# Patient Record
Sex: MALE | Race: WHITE | NOT HISPANIC OR LATINO | Employment: UNEMPLOYED | URBAN - METROPOLITAN AREA
[De-identification: names, ages, dates, MRNs, and addresses within clinical notes are randomized per-mention and may not be internally consistent; named-entity substitution may affect disease eponyms.]

---

## 2017-01-16 ENCOUNTER — OFFICE VISIT (OUTPATIENT)
Dept: INTERNAL MEDICINE | Facility: CLINIC | Age: 42
End: 2017-01-16
Payer: MEDICARE

## 2017-01-16 VITALS
DIASTOLIC BLOOD PRESSURE: 76 MMHG | HEART RATE: 82 BPM | WEIGHT: 182.75 LBS | BODY MASS INDEX: 27.7 KG/M2 | SYSTOLIC BLOOD PRESSURE: 126 MMHG | HEIGHT: 68 IN | RESPIRATION RATE: 17 BRPM

## 2017-01-16 DIAGNOSIS — Z86.19 HISTORY OF HEPATITIS C: ICD-10-CM

## 2017-01-16 DIAGNOSIS — H61.22 LEFT EAR IMPACTED CERUMEN: ICD-10-CM

## 2017-01-16 DIAGNOSIS — Z00.00 HEALTH CARE MAINTENANCE: Primary | ICD-10-CM

## 2017-01-16 DIAGNOSIS — H93.13 TINNITUS, BILATERAL: ICD-10-CM

## 2017-01-16 DIAGNOSIS — F19.11 HISTORY OF SUBSTANCE ABUSE: ICD-10-CM

## 2017-01-16 DIAGNOSIS — E66.3 OVERWEIGHT (BMI 25.0-29.9): ICD-10-CM

## 2017-01-16 DIAGNOSIS — G89.29 CHRONIC BILATERAL LOW BACK PAIN WITHOUT SCIATICA: ICD-10-CM

## 2017-01-16 DIAGNOSIS — M54.50 CHRONIC BILATERAL LOW BACK PAIN WITHOUT SCIATICA: ICD-10-CM

## 2017-01-16 PROCEDURE — 99999 PR PBB SHADOW E&M-EST. PATIENT-LVL IV: CPT | Mod: PBBFAC,,, | Performed by: INTERNAL MEDICINE

## 2017-01-16 PROCEDURE — 99396 PREV VISIT EST AGE 40-64: CPT | Mod: S$GLB,,, | Performed by: INTERNAL MEDICINE

## 2017-01-16 NOTE — MR AVS SNAPSHOT
[facility] - Internal Medicine  [address]  [facility] [address]  Phone: [phone]  Fax: [phone]                  Max Gaona   2017 3:20 PM   Office Visit    Description:  Male : 1975   Provider:  Christine Willoughby MD   Department:  [facility] - Internal Medicine           Reason for Visit     Follow-up           Diagnoses this Visit        Comments    Health care maintenance    -  Primary     History of hepatitis C         History of substance abuse         Tinnitus, bilateral         Left ear impacted cerumen         Overweight (BMI 25.0-29.9)         Chronic bilateral low back pain without sciatica                To Do List           Goals (5 Years of Data)     None      Follow-Up and Disposition     Return in about 1 year (around 2018), or if symptoms worsen or fail to improve, for 40 minute established physical.    Follow-up and Disposition History      [facility] -  Assistance  Registered nurses in the [facility] provide clinical advisement, health education, appointment booking, and other advisory services.  Call for this free service at [phone].             Medications           Message regarding Medications     Verify the changes and/or additions to your medication regime listed below are the same as discussed with your clinician today.  If any of these changes or additions are incorrect, please notify your healthcare provider.        STOP taking these medications     ledipasvir-sofosbuvir  mg Tab Take 1 tablet by mouth once daily.    ondansetron (ZOFRAN) 4 MG tablet Take 1 tablet (4 mg total) by mouth every 8 (eight) hours as needed for Nausea.           Verify that the below list of medications is an accurate representation of the medications you are currently taking.  If none reported, the list may be blank. If incorrect, please contact your healthcare provider. Carry this list with  "you in case of emergency.           Current Medications     SUBOXONE 8-2 mg Film            Clinical Reference Information           Vital Signs - Last Recorded  Most recent update: 1/16/2017  3:51 PM by Yamilet Cain MA    BP Pulse Resp Ht Wt BMI    126/76 82 17 5' 8" (1.727 m) 82.9 kg (182 lb 12.2 oz) 27.79 kg/m2      Blood Pressure          Most Recent Value    BP  126/76      Allergies as of 1/16/2017     No Known Allergies      Immunizations Administered on Date of Encounter - 1/16/2017     None      Orders Placed During Today's Visit      Normal Orders This Visit    Ambulatory consult to ENT     Ambulatory consult to [facility]     Future Labs/Procedures Expected by Expires    CBC auto differential  1/16/2017 3/17/2018    Comprehensive metabolic panel  1/16/2017 3/17/2018    Hemoglobin A1c  1/16/2017 3/17/2018    Hepatitis C RNA, quantitative, PCR  1/16/2017 3/17/2018    Lipid panel  1/16/2017 3/17/2018    TSH  1/16/2017 3/17/2018      [facility] Sign-Up     Activating your [facility] account is as easy as 1-2-3!     1) Visit [email or web address], select Sign Up Now, enter this activation code and your date of birth, then select Next.  [email or web address]  Expires: 3/2/2017  4:31 PM      2) Create a username and password to use when you visit [facility] in the future and select a security question in case you lose your password and select Next.    3) Enter your e-mail address and click Sign Up!    Additional Information  If you have questions, please e-mail [email or web address] or call [phone] to talk to our [facility] staff. Remember, [facility] is NOT to be used for urgent needs. For medical emergencies, dial 911.         "

## 2017-01-16 NOTE — PROGRESS NOTES
"Subjective:       Patient ID: Max Gaona is a 41 y.o. male.    Chief Complaint: Follow-up    HPI   40 yo M with hx of hep c now cured and hx drug abuse on suboxone presents for tinnitus and cancer screenings.  Last seen 2015.     He reports he is having ringing in his ears. Requests ear cleaning.     Leg pains - intermittent. After sitting or standing for long periods. Back gets stiff.   Not bothering him today.   Review of Systems   Constitutional: Negative for fever.   HENT: Positive for tinnitus.    Eyes: Negative.    Respiratory: Negative for shortness of breath.    Cardiovascular: Negative for chest pain and leg swelling.   Gastrointestinal: Negative for abdominal pain, diarrhea, nausea and vomiting.   Genitourinary: Negative.    Musculoskeletal: Positive for back pain. Negative for arthralgias.   Skin: Negative.  Negative for rash.   Psychiatric/Behavioral: Negative.        Objective:     Visit Vitals    /76    Pulse 82    Resp 17    Ht 5' 8" (1.727 m)    Wt 82.9 kg (182 lb 12.2 oz)    BMI 27.79 kg/m2        Physical Exam   Constitutional: He is oriented to person, place, and time. He appears well-developed and well-nourished.   HENT:   Head: Normocephalic and atraumatic.   Eyes: Conjunctivae and EOM are normal. Pupils are equal, round, and reactive to light.   Neck: Neck supple. No thyromegaly present.   Cardiovascular: Normal rate, regular rhythm and normal heart sounds.    No murmur heard.  Pulmonary/Chest: Effort normal and breath sounds normal. No respiratory distress. He has no wheezes.   Abdominal: Soft. Bowel sounds are normal. He exhibits no distension. There is no tenderness.   Musculoskeletal: Normal range of motion.   Neurological: He is alert and oriented to person, place, and time.   Skin: Skin is warm and dry. No rash noted.   Psychiatric: He has a normal mood and affect. Judgment and thought content normal.   Vitals reviewed.      Assessment:       1. Health care " maintenance    2. History of hepatitis C    3. History of substance abuse    4. Tinnitus, bilateral    5. Special screening for malignant neoplasm of prostate    6. Left ear impacted cerumen    7. Overweight (BMI 25.0-29.9)    8. Chronic bilateral low back pain without sciatica        Plan:       Max was seen today for follow-up.    Diagnoses and all orders for this visit:    Health care maintenance  -     CBC auto differential; Future  -     Comprehensive metabolic panel; Future  -     Hemoglobin A1c; Future  -     TSH; Future  -     Lipid panel; Future    History of hepatitis C s/p cure  -     Hepatitis C RNA, quantitative, PCR; Future    History of substance abuse  On suboxone, abstinence encouraged    Tinnitus, bilateral; Left ear impacted cerumen  -     Ambulatory consult to ENT    Overweight (BMI 25.0-29.9)  Weight loss    Chronic bilateral low back pain without sciatica  -     Ambulatory consult to [facility]

## 2017-02-10 ENCOUNTER — TELEPHONE (OUTPATIENT)
Dept: INTERNAL MEDICINE | Facility: CLINIC | Age: 42
End: 2017-02-10

## 2017-02-10 NOTE — TELEPHONE ENCOUNTER
----- Message from Shalini Martini sent at 2/10/2017  4:23 PM CST -----  Contact: Self/[phone] cell  Patient is calling to request medication for loss of appetite. He states he is not been able to eat or drink anything for the past few days. Pt would like to speak with someone in the office for advisement. Please call and advise.    Thank you!

## 2017-03-06 ENCOUNTER — TELEPHONE (OUTPATIENT)
Dept: INTERNAL MEDICINE | Facility: CLINIC | Age: 42
End: 2017-03-06

## 2017-03-06 NOTE — TELEPHONE ENCOUNTER
Labs ordered 1/16/17 - includes hep c - please schedule.   I would like to see him for abdominal pain and we can discuss workup and gi referral if needed - schedule urgent.

## 2017-03-06 NOTE — TELEPHONE ENCOUNTER
----- Message from Sol Molina sent at 3/6/2017 12:12 PM CST -----  Contact: self  Patient states requesting testing for Hep C and requesting referral to see Gastro for stomach problems.  Please call at [phone] for more detail info

## 2017-06-26 ENCOUNTER — NURSE TRIAGE (OUTPATIENT)
Dept: ADMINISTRATIVE | Facility: CLINIC | Age: 42
End: 2017-06-26

## 2017-06-26 ENCOUNTER — HOSPITAL ENCOUNTER (OUTPATIENT)
Facility: HOSPITAL | Age: 42
Discharge: LEFT BEFORE DISCHARGE | End: 2017-06-27
Attending: FAMILY MEDICINE | Admitting: SURGERY
Payer: MEDICARE

## 2017-06-26 DIAGNOSIS — K80.00 CALCULUS OF GALLBLADDER WITH ACUTE CHOLECYSTITIS WITHOUT OBSTRUCTION: Primary | ICD-10-CM

## 2017-06-26 LAB
ALBUMIN SERPL BCP-MCNC: 3.2 G/DL
ALP SERPL-CCNC: 91 U/L
ALT SERPL W/O P-5'-P-CCNC: 35 U/L
ANION GAP SERPL CALC-SCNC: 12 MMOL/L
AST SERPL-CCNC: 28 U/L
BACTERIA #/AREA URNS AUTO: ABNORMAL /HPF
BASOPHILS # BLD AUTO: 0.07 K/UL
BASOPHILS NFR BLD: 0.4 %
BILIRUB SERPL-MCNC: 0.7 MG/DL
BILIRUB UR QL STRIP: NEGATIVE
BUN SERPL-MCNC: 10 MG/DL
CALCIUM SERPL-MCNC: 9.4 MG/DL
CHLORIDE SERPL-SCNC: 95 MMOL/L
CLARITY UR REFRACT.AUTO: ABNORMAL
CO2 SERPL-SCNC: 30 MMOL/L
COLOR UR AUTO: ABNORMAL
CREAT SERPL-MCNC: 1.2 MG/DL
DIFFERENTIAL METHOD: ABNORMAL
EOSINOPHIL # BLD AUTO: 0.1 K/UL
EOSINOPHIL NFR BLD: 0.3 %
ERYTHROCYTE [DISTWIDTH] IN BLOOD BY AUTOMATED COUNT: 13.2 %
EST. GFR  (AFRICAN AMERICAN): >60 ML/MIN/1.73 M^2
EST. GFR  (NON AFRICAN AMERICAN): >60 ML/MIN/1.73 M^2
GLUCOSE SERPL-MCNC: 125 MG/DL
GLUCOSE UR QL STRIP: NEGATIVE
HCT VFR BLD AUTO: 43.9 %
HGB BLD-MCNC: 15 G/DL
HGB UR QL STRIP: NEGATIVE
HYALINE CASTS UR QL AUTO: 0 /LPF
KETONES UR QL STRIP: NEGATIVE
LEUKOCYTE ESTERASE UR QL STRIP: NEGATIVE
LIPASE SERPL-CCNC: 16 U/L
LYMPHOCYTES # BLD AUTO: 1.9 K/UL
LYMPHOCYTES NFR BLD: 11 %
MCH RBC QN AUTO: 29.8 PG
MCHC RBC AUTO-ENTMCNC: 34.2 %
MCV RBC AUTO: 87 FL
MICROSCOPIC COMMENT: ABNORMAL
MONOCYTES # BLD AUTO: 2.2 K/UL
MONOCYTES NFR BLD: 13.1 %
NEUTROPHILS # BLD AUTO: 12.5 K/UL
NEUTROPHILS NFR BLD: 75.2 %
NITRITE UR QL STRIP: NEGATIVE
PH UR STRIP: 6 [PH] (ref 5–8)
PLATELET # BLD AUTO: 249 K/UL
PMV BLD AUTO: 10.3 FL
POTASSIUM SERPL-SCNC: 3.9 MMOL/L
PROT SERPL-MCNC: 8.1 G/DL
PROT UR QL STRIP: ABNORMAL
RBC # BLD AUTO: 5.04 M/UL
RBC #/AREA URNS AUTO: 2 /HPF (ref 0–4)
SODIUM SERPL-SCNC: 137 MMOL/L
SP GR UR STRIP: 1.02 (ref 1–1.03)
URN SPEC COLLECT METH UR: ABNORMAL
UROBILINOGEN UR STRIP-ACNC: 2 EU/DL
WBC # BLD AUTO: 16.83 K/UL
WBC #/AREA URNS AUTO: 2 /HPF (ref 0–5)

## 2017-06-26 PROCEDURE — 83690 ASSAY OF LIPASE: CPT

## 2017-06-26 PROCEDURE — 99285 EMERGENCY DEPT VISIT HI MDM: CPT | Mod: 25

## 2017-06-26 PROCEDURE — G0378 HOSPITAL OBSERVATION PER HR: HCPCS

## 2017-06-26 PROCEDURE — 80053 COMPREHEN METABOLIC PANEL: CPT

## 2017-06-26 PROCEDURE — 63600175 PHARM REV CODE 636 W HCPCS: Performed by: PHYSICIAN ASSISTANT

## 2017-06-26 PROCEDURE — 81003 URINALYSIS AUTO W/O SCOPE: CPT

## 2017-06-26 PROCEDURE — 25000003 PHARM REV CODE 250: Performed by: NURSE PRACTITIONER

## 2017-06-26 PROCEDURE — 99284 EMERGENCY DEPT VISIT MOD MDM: CPT | Mod: ,,, | Performed by: NURSE PRACTITIONER

## 2017-06-26 PROCEDURE — 85025 COMPLETE CBC W/AUTO DIFF WBC: CPT

## 2017-06-26 PROCEDURE — 96365 THER/PROPH/DIAG IV INF INIT: CPT

## 2017-06-26 PROCEDURE — 25000003 PHARM REV CODE 250: Performed by: PHYSICIAN ASSISTANT

## 2017-06-26 PROCEDURE — 96361 HYDRATE IV INFUSION ADD-ON: CPT

## 2017-06-26 PROCEDURE — 81001 URINALYSIS AUTO W/SCOPE: CPT

## 2017-06-26 RX ORDER — DIPHENHYDRAMINE HYDROCHLORIDE 50 MG/ML
25 INJECTION INTRAMUSCULAR; INTRAVENOUS EVERY 4 HOURS PRN
Status: DISCONTINUED | OUTPATIENT
Start: 2017-06-26 | End: 2017-06-27 | Stop reason: HOSPADM

## 2017-06-26 RX ORDER — METRONIDAZOLE 500 MG/100ML
500 INJECTION, SOLUTION INTRAVENOUS
Status: DISCONTINUED | OUTPATIENT
Start: 2017-06-26 | End: 2017-06-27 | Stop reason: HOSPADM

## 2017-06-26 RX ORDER — ONDANSETRON 2 MG/ML
4 INJECTION INTRAMUSCULAR; INTRAVENOUS EVERY 12 HOURS PRN
Status: DISCONTINUED | OUTPATIENT
Start: 2017-06-26 | End: 2017-06-27 | Stop reason: HOSPADM

## 2017-06-26 RX ORDER — HYDROMORPHONE HYDROCHLORIDE 1 MG/ML
1 INJECTION, SOLUTION INTRAMUSCULAR; INTRAVENOUS; SUBCUTANEOUS EVERY 4 HOURS PRN
Status: DISCONTINUED | OUTPATIENT
Start: 2017-06-26 | End: 2017-06-27 | Stop reason: HOSPADM

## 2017-06-26 RX ORDER — SODIUM CHLORIDE, SODIUM LACTATE, POTASSIUM CHLORIDE, CALCIUM CHLORIDE 600; 310; 30; 20 MG/100ML; MG/100ML; MG/100ML; MG/100ML
INJECTION, SOLUTION INTRAVENOUS CONTINUOUS
Status: DISCONTINUED | OUTPATIENT
Start: 2017-06-26 | End: 2017-06-27 | Stop reason: HOSPADM

## 2017-06-26 RX ORDER — CIPROFLOXACIN 2 MG/ML
400 INJECTION, SOLUTION INTRAVENOUS
Status: DISCONTINUED | OUTPATIENT
Start: 2017-06-26 | End: 2017-06-27 | Stop reason: HOSPADM

## 2017-06-26 RX ADMIN — SODIUM CHLORIDE 1000 ML: 0.9 INJECTION, SOLUTION INTRAVENOUS at 10:06

## 2017-06-26 RX ADMIN — METRONIDAZOLE 500 MG: 500 INJECTION, SOLUTION INTRAVENOUS at 05:06

## 2017-06-26 RX ADMIN — METRONIDAZOLE 500 MG: 500 INJECTION, SOLUTION INTRAVENOUS at 11:06

## 2017-06-26 RX ADMIN — HYDROMORPHONE HYDROCHLORIDE 1 MG: 1 INJECTION, SOLUTION INTRAMUSCULAR; INTRAVENOUS; SUBCUTANEOUS at 11:06

## 2017-06-26 RX ADMIN — CIPROFLOXACIN 400 MG: 2 INJECTION, SOLUTION INTRAVENOUS at 03:06

## 2017-06-26 RX ADMIN — SODIUM CHLORIDE, SODIUM LACTATE, POTASSIUM CHLORIDE, AND CALCIUM CHLORIDE: .6; .31; .03; .02 INJECTION, SOLUTION INTRAVENOUS at 03:06

## 2017-06-26 NOTE — CONSULTS
[facility]  General Surgery  Consult Note    Inpatient consult to General Surgery  Consult performed by: RAMIRO VERAS  Consult ordered by: RALPH TEE  Reason for consult: cholelithiasis        Subjective:     Chief Complaint/Reason for Admission: Calculus of gallbladder with acute cholecystitis without obstruction    History of Present Illness:   Patient is a 42 yo male who presents to the ED for evaluation of RUQ abdominal pain which is associated with N/V for the past week. He states he is only just now beginning to tolerate liquids and has been drinking lemonade, watermelon and oranges. He denies vomiting today. He states he has not had a bowel movement since Monday 6/19/17. He is passing flatus. He has not been taking his temperature at home, but denies fever. Admits to chills. Has never had an episodes like this in the past. Work up in ED includes US and CBC, revealing gallbladder sludge/punctate stone without evidence of cholecystitis as well as WBC of 16.  History of HCC s/p harvoni treatment. Is on suboxone.   General surgery has been consulted for evaluation of cholelithiasis.    No current facility-administered medications on file prior to encounter.      Current Outpatient Prescriptions on File Prior to Encounter   Medication Sig    SUBOXONE 8-2 mg Film        Review of patient's allergies indicates:  No Known Allergies    Past Medical History:   Diagnosis Date    Drug abuse     Hepatitis C      History reviewed. No pertinent surgical history.  Family History     Problem Relation (Age of Onset)    Diabetes Maternal Grandfather    Heart attacks under age 50 Cousin (45)    Heart disease Mother, Father, Maternal Uncle, Maternal Grandmother        Social History Main Topics    Smoking status: Former Smoker     Packs/day: 0.50     Years: 15.00     Types: Cigarettes     Quit date: 1/1/2013    Smokeless tobacco: Never Used    Alcohol use No      Comment: quit    Drug use: No       Comment: heroin abuse    Sexual activity: Yes     Partners: Female     Birth control/ protection: None     Review of Systems   Constitutional: Positive for chills. Negative for fever.   HENT: Negative for sneezing and trouble swallowing.    Eyes: Negative for photophobia and visual disturbance.   Respiratory: Negative for cough and shortness of breath.    Cardiovascular: Negative for chest pain and palpitations.   Gastrointestinal: Positive for abdominal pain, nausea and vomiting. Negative for constipation and diarrhea.   Endocrine: Negative for cold intolerance and heat intolerance.   Musculoskeletal: Negative for arthralgias and myalgias.   Skin: Negative for rash and wound.   Allergic/Immunologic: Negative for immunocompromised state.   Neurological: Negative for tremors and weakness.   Hematological: Negative for adenopathy.   Psychiatric/Behavioral: Negative for agitation.     Objective:     Vital Signs (Most Recent):  Temp: 98.3 °F (36.8 °C) (06/26/17 0909)  Pulse: 99 (06/26/17 0909)  Resp: 18 (06/26/17 0909)  BP: 121/67 (06/26/17 0909)  SpO2: 100 % (06/26/17 0909) Vital Signs (24h Range):  Temp:  [98.3 °F (36.8 °C)] 98.3 °F (36.8 °C)  Pulse:  [99] 99  Resp:  [18] 18  SpO2:  [100 %] 100 %  BP: (121)/(67) 121/67     Weight: 80.7 kg (178 lb)  Body mass index is 26.29 kg/m².    No intake or output data in the 24 hours ending 06/26/17 1405    Physical Exam   Constitutional: He is oriented to person, place, and time. He appears well-developed and well-nourished. No distress.   HENT:   Head: Normocephalic and atraumatic.   Eyes: EOM are normal. No scleral icterus.   Neck: Normal range of motion. Neck supple.   Cardiovascular: Normal rate and regular rhythm.    Pulmonary/Chest: Effort normal. No respiratory distress.   Abdominal:   Soft, +TTP in RUQ, no masses, no previous scars noted   Musculoskeletal: Normal range of motion. He exhibits no edema or tenderness.   Neurological: He is alert and oriented to  person, place, and time.   Skin: Skin is warm and dry.   Psychiatric: He has a normal mood and affect.       Significant Labs:  BMP:   Recent Labs  Lab 06/26/17  0923   *      K 3.9   CL 95   CO2 30*   BUN 10   CREATININE 1.2   CALCIUM 9.4       CBC:   Recent Labs  Lab 06/26/17 0923   WBC 16.83*   RBC 5.04   HGB 15.0   HCT 43.9      MCV 87   MCH 29.8   MCHC 34.2     CMP:   Recent Labs  Lab 06/26/17 0923   *   CALCIUM 9.4   ALBUMIN 3.2*   PROT 8.1      K 3.9   CO2 30*   CL 95   BUN 10   CREATININE 1.2   ALKPHOS 91   ALT 35   AST 28   BILITOT 0.7     LFTs:   Recent Labs  Lab 06/26/17 0923   ALT 35   AST 28   ALKPHOS 91   BILITOT 0.7   PROT 8.1   ALBUMIN 3.2*     Lipase:   Recent Labs  Lab 06/26/17 0923   LIPASE 16     All pertinent labs from the last 24 hours have been reviewed.    Significant Diagnostics:  Narrative     Time of Procedure: 06/26/17 11:10:00  Accession # [id number]    Technique: Limited abdominal ultrasound    Comparison: Ultrasound abdomen 1/13/2015    Clinical Indication: Right upper quadrant pain.    Findings:    The visualized portion of the pancreas is unremarkable.      The liver is normal in size.  The liver demonstrates homogeneous echotexture.  No focal hepatic lesions are seen.    The gallbladder demonstrates sludge and punctate gallstones.  The common duct is not dilated, measuring 4 mm.  The gallbladder wall is not thickened.  There is no sonographic Campbell sign.  No dilated intrahepatic radicles are seen.  No pericholecystic fluid.    The visualized right kidney demonstrates minimal pelviectasis.   Impression         1.  Gallbladder sludge/punctate stones without evidence of cholecystitis.    2. Minimal right pelviectasis.         Assessment/Plan:     Active Diagnoses:    Diagnosis Date Noted POA    PRINCIPAL PROBLEM:  Calculus of gallbladder with acute cholecystitis without obstruction [K80.00] 06/26/2017 Unknown      Problems Resolved During this  Admission:    Diagnosis Date Noted Date Resolved POA     Admit to surgical service  Plan for cholecystectomy tomorrow in OR  NPO p MN  Abx, IVF  Pain/nausea meds PRN  Consents to be obtained    Thank you for your consult. I will follow-up with patient. Please contact us if you have any additional questions.    Ivy Koo PA-C   [id number]  General Surgery  [facility]    I have personally performed a detailed history and physical examination on this patient. My findings are summarized in the resident's note included in the record.  Scheduled for lap choley 6/27

## 2017-06-26 NOTE — PLAN OF CARE
Problem: Patient Care Overview  Goal: Plan of Care Review  Outcome: Ongoing (interventions implemented as appropriate)  Pt arrived to OBS 11 from ED via stretcher. Pt aao x 4, ambulated to bed. POC reviewed with pt.  Pt c/o pain, but refuses medication @ this time. Safety precautions maintained. Bed in low position,wheels locked. Side rails up x 2. Call light within reach. Pt oriented to Rm and diet menu.

## 2017-06-26 NOTE — ED NOTES
GENERAL: The patient is a well-developed, well-nourished male in no apparent distress. He is alert and oriented x3.    HEENT: Head is normocephalic and atraumatic. Extraocular muscles are intact. Pupils are equal, round, and reactive to light and accommodation. Nares appeared normal. Mouth is well hydrated and without lesions. Mucous membranes are moist. Posterior pharynx clear of any exudate or lesions.    NECK: Supple. No carotid bruits. No lymphadenopathy or thyromegaly.    LUNGS: Clear to auscultation.    HEART: Regular rate and rhythm without murmur.     ABDOMEN: Soft, tender to palpation RLQ but nondistended. Positive bowel sounds. No hepatosplenomegaly was noted.     EXTREMITIES: Without any cyanosis, clubbing, rash, lesions or edema.     NEUROLOGIC: Cranial nerves II through XII are grossly intact.     PSYCHIATRIC: Flat affect, but denies suicidal or homicidal ideations.    SKIN: No ulceration or induration present.

## 2017-06-26 NOTE — PROVIDER PROGRESS NOTES - EMERGENCY DEPT.
Encounter Date: 6/26/2017    ED Physician Progress Notes       SCRIBE NOTE: IMatthew, am scribing for, and in the presence of,  Hilda Garza NP.  Physician Statement: IHilda NP, personally performed the services described in this documentation as scribed by Matthew Lancaster in my presence, and it is both accurate and complete.     Physician Note:   Time seen by provider: 9:56 AM    This is a 41 y.o. male who presents for evaluation of RUQ abdominal pain associated with nausea and vomiting for 1 week duration. Pt has just started to be able to tolerate liquids in the last 2-3 days, but has been unable to tolerate much food. Denies any fever, chills or diarrhea.     I initially evaluated this patient and ordered workup while in intake.  The patient will receive a full evaluation in an ED pod when space is available.  All results from tests ordered in intake will not be followed by the intake team, including myself. All results will be followed by the ED Pod team.

## 2017-06-26 NOTE — TELEPHONE ENCOUNTER
Reason for Disposition   SEVERE abdominal pain (e.g., excruciating)    Protocols used:  ABDOMINAL PAIN - MALE-A-OH    Max is calling to report severe abdominal pain.  Unable to eat anything.  Per protocol advised to go to nearest ER.

## 2017-06-26 NOTE — ED PROVIDER NOTES
Encounter Date: 6/26/2017    SCRIBE #1 NOTE: I, Matthew Lancaster, am scribing for, and in the presence of,  Hilda Garza NP. I have scribed the entire note.       History     Chief Complaint   Patient presents with    Abdominal Pain     Time seen by provider: 9:56 AM     This is a 41 y.o. male with PMH of Hepatitis C who presents for evaluation of RUQ abdominal pain associated with nausea and vomiting for 1 week duration. No mitigating or exacerbating factors reported.  Pt has just started to be able to tolerate liquids in the last 2-3 days, but has been unable to tolerate much food other than some orange and watermelon. Denies any fever, chills or diarrhea. No further complaints or concerns at this time.         The history is provided by the patient and medical records.     Review of patient's allergies indicates:  No Known Allergies  Past Medical History:   Diagnosis Date    Drug abuse     Hepatitis C      History reviewed. No pertinent surgical history.  Family History   Problem Relation Age of Onset    Heart disease Mother     Heart disease Father     Diabetes Maternal Grandfather      juvenile    Heart attacks under age 50 Cousin 45    Heart disease Maternal Uncle     Heart disease Maternal Grandmother     Cancer Neg Hx      Social History   Substance Use Topics    Smoking status: Former Smoker     Packs/day: 0.50     Years: 15.00     Types: Cigarettes     Quit date: 1/1/2013    Smokeless tobacco: Never Used    Alcohol use No      Comment: quit     Review of Systems   Constitutional: Negative for chills and fever.   HENT: Negative for sore throat.    Respiratory: Negative for shortness of breath.    Cardiovascular: Negative for chest pain.   Gastrointestinal: Positive for abdominal pain (RUQ), nausea and vomiting. Negative for diarrhea.   Genitourinary: Negative for dysuria.   Musculoskeletal: Negative for back pain.   Skin: Negative for rash.   Neurological: Negative for weakness.   Hematological:  Does not bruise/bleed easily.       Physical Exam     Initial Vitals [06/26/17 0909]   BP Pulse Resp Temp SpO2   121/67 99 18 98.3 °F (36.8 °C) 100 %      MAP       85         Physical Exam   Nursing note and vitals reviewed.  Constitutional: Patient appears well-developed and well-nourished. Not diaphoretic. No distress.   HENT:   Head: Normocephalic and atraumatic.   Mouth: Dry mucous membranes.  Eyes: Conjunctivae are normal. No scleral icterus.   Neck: Normal range of motion. Neck supple.   Cardiovascular: Normal rate, regular rhythm and normal heart sounds.   Pulmonary/Chest: Breath sounds normal. No respiratory distress.  Abdominal: There is tenderness to palpation to the RUQ without rebound or guarding.   : No CVA tenderness.   Musculoskeletal: Normal range of motion. No edema or tenderness.   Neurological: Alert and oriented to person, place, and time. Normal strength. No sensory deficit.   Skin: Skin is warm and dry. No rash noted. No erythema. No pallor.   Psychiatric: Normal mood and affect. Thought content normal.       ED Course   Procedures  Labs Reviewed   CBC W/ AUTO DIFFERENTIAL - Abnormal; Notable for the following:        Result Value    WBC 16.83 (*)     Gran # 12.5 (*)     Mono # 2.2 (*)     Gran% 75.2 (*)     Lymph% 11.0 (*)     All other components within normal limits   COMPREHENSIVE METABOLIC PANEL - Abnormal; Notable for the following:     CO2 30 (*)     Glucose 125 (*)     Albumin 3.2 (*)     All other components within normal limits   URINALYSIS, REFLEX TO URINE CULTURE - Abnormal; Notable for the following:     Appearance, UA Hazy (*)     Protein, UA 2+ (*)     All other components within normal limits    Narrative:     Preferred Collection Type->Urine, Clean Catch   URINALYSIS MICROSCOPIC - Abnormal; Notable for the following:     Bacteria, UA Moderate (*)     All other components within normal limits    Narrative:     Preferred Collection Type->Urine, Clean Catch   LIPASE        Imaging Results          US Abdomen Limited (Final result)  Result time 06/26/17 12:06:32    Final result by Shukri Gregorio MD (06/26/17 12:06:32)                 Impression:        1.  Gallbladder sludge/punctate stones without evidence of cholecystitis.    2. Minimal right pelviectasis.  ______________________________________     Electronically signed by resident: GURVINDER SUMMERS MD  Date:     06/26/17  Time:    11:46            As the supervising and teaching physician, I personally reviewed the images and resident's interpretation and I agree with the findings.          Electronically signed by: SHUKRI GREGORIO MD  Date:     06/26/17  Time:    12:06              Narrative:    Time of Procedure: 06/26/17 11:10:00  Accession # [id number]    Technique: Limited abdominal ultrasound    Comparison: Ultrasound abdomen 1/13/2015    Clinical Indication: Right upper quadrant pain.    Findings:    The visualized portion of the pancreas is unremarkable.      The liver is normal in size.  The liver demonstrates homogeneous echotexture.  No focal hepatic lesions are seen.    The gallbladder demonstrates sludge and punctate gallstones.  The common duct is not dilated, measuring 4 mm.  The gallbladder wall is not thickened.  There is no sonographic Campbell sign.  No dilated intrahepatic radicles are seen.  No pericholecystic fluid.    The visualized right kidney demonstrates minimal pelviectasis.                                      Medical Decision Making:   History:   Old Medical Records: I decided to obtain old medical records.  Initial Assessment:   41 year old male who had a 1 week Hx of RUQ abdominal pain, N/V. On exam, he appears dehydrated. He has an elevated WBC count of almost 17,000 and his gallbladder US is remarkable for gallbladder sludge and multiple stones without cholecystitis. General surgery was consulted and will admit the pt to their service for further evaluation and treatment. Pt was hydrated with  IV fluids. He declined pain medications or antiemetics.   Clinical Tests:   Lab Tests: Ordered and Reviewed  Radiological Study: Ordered and Reviewed  Other:   I have discussed this case with another health care provider.            Scribe Attestation:   Scribe #1: I performed the above scribed service and the documentation accurately describes the services I performed. I attest to the accuracy of the note.    Attending Attestation:           Physician Attestation for Scribe:  Physician Attestation Statement for Scribe #1: I, Hilda Garza NP, reviewed documentation, as scribed by Matthew Lancaster in my presence, and it is both accurate and complete.                 ED Course     Clinical Impression:   The encounter diagnosis was Calculus of gallbladder with acute cholecystitis without obstruction.    Disposition:   Disposition: Admitted                        Hilda Garza NP  06/26/17 1601

## 2017-06-27 VITALS
HEIGHT: 69 IN | DIASTOLIC BLOOD PRESSURE: 69 MMHG | OXYGEN SATURATION: 98 % | WEIGHT: 178 LBS | BODY MASS INDEX: 26.36 KG/M2 | SYSTOLIC BLOOD PRESSURE: 117 MMHG | RESPIRATION RATE: 18 BRPM | HEART RATE: 62 BPM | TEMPERATURE: 97 F

## 2017-06-27 LAB
ALBUMIN SERPL BCP-MCNC: 2.5 G/DL
ALP SERPL-CCNC: 75 U/L
ALT SERPL W/O P-5'-P-CCNC: 21 U/L
ANION GAP SERPL CALC-SCNC: 9 MMOL/L
ANISOCYTOSIS BLD QL SMEAR: SLIGHT
AST SERPL-CCNC: 20 U/L
BASOPHILS # BLD AUTO: 0.04 K/UL
BASOPHILS NFR BLD: 0.4 %
BILIRUB SERPL-MCNC: 0.5 MG/DL
BUN SERPL-MCNC: 12 MG/DL
CALCIUM SERPL-MCNC: 8.9 MG/DL
CHLORIDE SERPL-SCNC: 98 MMOL/L
CO2 SERPL-SCNC: 28 MMOL/L
CREAT SERPL-MCNC: 0.9 MG/DL
DIFFERENTIAL METHOD: ABNORMAL
EOSINOPHIL # BLD AUTO: 0.2 K/UL
EOSINOPHIL NFR BLD: 1.7 %
ERYTHROCYTE [DISTWIDTH] IN BLOOD BY AUTOMATED COUNT: 13.2 %
EST. GFR  (AFRICAN AMERICAN): >60 ML/MIN/1.73 M^2
EST. GFR  (NON AFRICAN AMERICAN): >60 ML/MIN/1.73 M^2
GIANT PLATELETS BLD QL SMEAR: PRESENT
GLUCOSE SERPL-MCNC: 139 MG/DL
HCT VFR BLD AUTO: 36.4 %
HGB BLD-MCNC: 12.1 G/DL
LYMPHOCYTES # BLD AUTO: 2.5 K/UL
LYMPHOCYTES NFR BLD: 23.1 %
MAGNESIUM SERPL-MCNC: 2 MG/DL
MCH RBC QN AUTO: 28.9 PG
MCHC RBC AUTO-ENTMCNC: 33.2 %
MCV RBC AUTO: 87 FL
MONOCYTES # BLD AUTO: 1.1 K/UL
MONOCYTES NFR BLD: 10.2 %
NEUTROPHILS # BLD AUTO: 7 K/UL
NEUTROPHILS NFR BLD: 64.6 %
PHOSPHATE SERPL-MCNC: 3.3 MG/DL
PLATELET # BLD AUTO: 245 K/UL
PLATELET BLD QL SMEAR: ABNORMAL
PMV BLD AUTO: 9.9 FL
POTASSIUM SERPL-SCNC: 4.1 MMOL/L
PROT SERPL-MCNC: 6.5 G/DL
RBC # BLD AUTO: 4.19 M/UL
SODIUM SERPL-SCNC: 135 MMOL/L
WBC # BLD AUTO: 10.74 K/UL

## 2017-06-27 PROCEDURE — 36415 COLL VENOUS BLD VENIPUNCTURE: CPT

## 2017-06-27 PROCEDURE — 84100 ASSAY OF PHOSPHORUS: CPT

## 2017-06-27 PROCEDURE — 63600175 PHARM REV CODE 636 W HCPCS: Performed by: PHYSICIAN ASSISTANT

## 2017-06-27 PROCEDURE — 85025 COMPLETE CBC W/AUTO DIFF WBC: CPT

## 2017-06-27 PROCEDURE — 83735 ASSAY OF MAGNESIUM: CPT

## 2017-06-27 PROCEDURE — G0378 HOSPITAL OBSERVATION PER HR: HCPCS

## 2017-06-27 PROCEDURE — 80053 COMPREHEN METABOLIC PANEL: CPT

## 2017-06-27 RX ADMIN — CIPROFLOXACIN 400 MG: 2 INJECTION, SOLUTION INTRAVENOUS at 03:06

## 2017-06-27 NOTE — NURSING
Called to the front of the hospital by security. Patient found to have 18G RAC PIV still intact. Removed without difficulty. Tried to encourage patient to stay, he states that is will seek treatment elsewhere.

## 2017-06-27 NOTE — PROGRESS NOTES
[facility]  General Surgery  Progress Note    Subjective:     Post-Op Info:  Procedure(s) (LRB):  CHOLECYSTECTOMY-LAPAROSCOPIC (N/A)         Interval History:   Patient seen and examined, no acute events overnight, has complaints about IV this morning, still with abdominal pain, denies N/V, at to OR today for cholecystectomy    Medications:  Continuous Infusions:   lactated Ringers 125 mL/hr at 06/26/17 1523     Scheduled Meds:   ciprofloxacin (CIPRO)400mg/200ml D5W IVPB  400 mg Intravenous Q12H    metronidazole  500 mg Intravenous Q8H     PRN Meds:diphenhydrAMINE, HYDROmorphone, ondansetron     Review of patient's allergies indicates:  No Known Allergies  Objective:     Vital Signs (Most Recent):  Temp: 97.3 °F (36.3 °C) (06/27/17 0400)  Pulse: 67 (06/27/17 0400)  Resp: 18 (06/27/17 0400)  BP: (!) 125/58 (06/27/17 0400)  SpO2: 99 % (06/27/17 0400) Vital Signs (24h Range):  Temp:  [97.3 °F (36.3 °C)-100 °F (37.8 °C)] 97.3 °F (36.3 °C)  Pulse:  [67-99] 67  Resp:  [17-18] 18  SpO2:  [98 %-100 %] 99 %  BP: (111-139)/(57-67) 125/58     Weight: 80.7 kg (178 lb)  Body mass index is 26.29 kg/m².    Intake/Output - Last 3 Shifts     None          Physical Exam   Constitutional: He is oriented to person, place, and time. He appears well-developed and well-nourished. No distress.   HENT:   Head: Normocephalic and atraumatic.   Neck: Normal range of motion. Neck supple.   Cardiovascular: Normal rate and regular rhythm.    Pulmonary/Chest: Effort normal. No respiratory distress.   Abdominal:   Soft, +TTP in RUQ   Musculoskeletal: Normal range of motion. He exhibits no edema or tenderness.   Neurological: He is alert and oriented to person, place, and time.   Psychiatric: His mood appears anxious. He is agitated.       Significant Labs:  CBC:   Recent Labs  Lab 06/27/17  0447   WBC 10.74   RBC 4.19*   HGB 12.1*   HCT 36.4*      MCV 87   MCH 28.9   MCHC 33.2     BMP:   Recent Labs  Lab 06/27/17  3854    *   *   K 4.1   CL 98   CO2 28   BUN 12   CREATININE 0.9   CALCIUM 8.9   MG 2.0     CMP:   Recent Labs  Lab 06/27/17  0447   *   CALCIUM 8.9   ALBUMIN 2.5*   PROT 6.5   *   K 4.1   CO2 28   CL 98   BUN 12   CREATININE 0.9   ALKPHOS 75   ALT 21   AST 20   BILITOT 0.5     LFTs:   Recent Labs  Lab 06/27/17  0447   ALT 21   AST 20   ALKPHOS 75   BILITOT 0.5   PROT 6.5   ALBUMIN 2.5*     Assessment/Plan:     * Calculus of gallbladder with acute cholecystitis without obstruction    To OR today for cholecystectomy  Consents to be obtained            Ivy Koo PA-C   [id number]  General Surgery  [facility]

## 2017-06-27 NOTE — SUBJECTIVE & OBJECTIVE
Interval History:   Patient seen and examined, no acute events overnight, has complaints about IV this morning, still with abdominal pain, denies N/V, at to OR today for cholecystectomy    Medications:  Continuous Infusions:   lactated Ringers 125 mL/hr at 06/26/17 1523     Scheduled Meds:   ciprofloxacin (CIPRO)400mg/200ml D5W IVPB  400 mg Intravenous Q12H    metronidazole  500 mg Intravenous Q8H     PRN Meds:diphenhydrAMINE, HYDROmorphone, ondansetron     Review of patient's allergies indicates:  No Known Allergies  Objective:     Vital Signs (Most Recent):  Temp: 97.3 °F (36.3 °C) (06/27/17 0400)  Pulse: 67 (06/27/17 0400)  Resp: 18 (06/27/17 0400)  BP: (!) 125/58 (06/27/17 0400)  SpO2: 99 % (06/27/17 0400) Vital Signs (24h Range):  Temp:  [97.3 °F (36.3 °C)-100 °F (37.8 °C)] 97.3 °F (36.3 °C)  Pulse:  [67-99] 67  Resp:  [17-18] 18  SpO2:  [98 %-100 %] 99 %  BP: (111-139)/(57-67) 125/58     Weight: 80.7 kg (178 lb)  Body mass index is 26.29 kg/m².    Intake/Output - Last 3 Shifts     None          Physical Exam   Constitutional: He is oriented to person, place, and time. He appears well-developed and well-nourished. No distress.   HENT:   Head: Normocephalic and atraumatic.   Neck: Normal range of motion. Neck supple.   Cardiovascular: Normal rate and regular rhythm.    Pulmonary/Chest: Effort normal. No respiratory distress.   Abdominal:   Soft, +TTP in RUQ   Musculoskeletal: Normal range of motion. He exhibits no edema or tenderness.   Neurological: He is alert and oriented to person, place, and time.   Psychiatric: His mood appears anxious. He is agitated.       Significant Labs:  CBC:   Recent Labs  Lab 06/27/17 0447   WBC 10.74   RBC 4.19*   HGB 12.1*   HCT 36.4*      MCV 87   MCH 28.9   MCHC 33.2     BMP:   Recent Labs  Lab 06/27/17  0447   *   *   K 4.1   CL 98   CO2 28   BUN 12   CREATININE 0.9   CALCIUM 8.9   MG 2.0     CMP:   Recent Labs  Lab 06/27/17 0447   *   CALCIUM  8.9   ALBUMIN 2.5*   PROT 6.5   *   K 4.1   CO2 28   CL 98   BUN 12   CREATININE 0.9   ALKPHOS 75   ALT 21   AST 20   BILITOT 0.5     LFTs:   Recent Labs  Lab 06/27/17  0447   ALT 21   AST 20   ALKPHOS 75   BILITOT 0.5   PROT 6.5   ALBUMIN 2.5*

## 2017-06-27 NOTE — PLAN OF CARE
11:00 AM Unable to complete Discharge planning assessment due to patient already discharged to home w/no needs.        06/27/17 1100   Discharge Assessment   Assessment Type Discharge Planning Assessment   Confirmed/corrected address and phone number on facesheet? No   Assessment information obtained from? Medical Record   Expected Length of Stay (days) (1)   Communicated expected length of stay with patient/caregiver yes  (Per MD)   Type of Healthcare Directive Received (Unknown)   Prior to hospitilization cognitive status: Alert/Oriented   Current cognitive status: Alert/Oriented   Arrived From home or self-care  (Via ER)   How many people do you have in your home that can help with your care after discharge? 1   Who are your caregiver(s) and their phone number(s)? (Cortney Gaona     [phone] )   Patient's perception of discharge disposition home or selfcare   Does the patient have transportation to healthcare appointments? Yes   Transportation Available family or friend will provide   Discharge Plan A Home with family   Discharge Plan B Home with family   Patient/Family In Agreement With Plan yes  (Per MD & floor nurse)        06/27/17 1100   Discharge Assessment   Assessment Type Discharge Planning Assessment   Confirmed/corrected address and phone number on facesheet? No   Assessment information obtained from? Medical Record   Expected Length of Stay (days) (1)   Communicated expected length of stay with patient/caregiver yes  (Per MD)   Type of Healthcare Directive Received (Unknown)   Prior to hospitilization cognitive status: Alert/Oriented   Current cognitive status: Alert/Oriented   Arrived From home or self-care  (Via ER)   How many people do you have in your home that can help with your care after discharge? 1   Who are your caregiver(s) and their phone number(s)? (Cortney Gaona     [phone] )   Patient's perception of discharge disposition home or selfcare   Does the  patient have transportation to healthcare appointments? Yes   Transportation Available family or friend will provide   Discharge Plan A Home with family   Discharge Plan B Home with family   Patient/Family In Agreement With Plan yes  (Per MD & floor nurse)

## 2017-06-27 NOTE — PLAN OF CARE
"Psychiatry Plan of Care Note:    Psychiatry was consulted by general surgery to assess this patient's reported agitation.    Patient is a 40yo man with history of HCC s/p Harvoni treatment, and ?heroin use disorder currently on suboxone treatment, who presented to the ED with ~1week of abdominal pain and anorexia who was admitted overnight for cholecystectomy.     Per reports, the patient was calm and cooperative at admission, but, overnight, became upset about IV pain. This morning, he gruffly addressed his surgeons and insisted that he be allowed to go to the bathroom prior to talking to them. Upon returning from the bathroom he was upset, yelling, and slammed a tray in his room when he found out the surgeon had left to see other patients. He was so upset that the Observation nurses were concerned for safety and security had to be called    He did not hurt or threaten to hurt himself or others.    On my eval:  The patient is calmly lying in bed with the lights off. He is having abdominal pain and is asking about the status of his surgery. He admits to being rude to the surgeons this morning, but states that it was "because I've been in such pain for the past week". He admits to being upset, and yelling. He reports feeling very threatened when security entered his room, because he felt that their presence was excessive.   He denies thoughts of harming himself or others. He will not answer questions regarding his mood or past psychiatric history, but denies history of psychosis. The patient is aware of his current diagnosis of cholecystitis and freely relays to me the risks of refusing medical care. He lives alone in an apartment in [facility], but will not answer other social questions.      Exam:  Vitals:    06/27/17 0800   BP: 117/69   Pulse: 62   Resp: 18   Temp: 97.2 °F (36.2 °C)     Mental Status Exam:   Arousal: Alert, awake  Appearance:  fair grooming, clean street clothes  Sensorium/Orientation: Oriented to " "person, place, situation, month and year  Behavior/Cooperation: Uncooperative  Psychomotor: No PMA or PMR  Speech: Normal rate, rhythm, prosody, volume and tone  Language: Fluent english  Mood: "Upset"  Affect: Reactive, mood congruent  Thought Process: Linear   Thought Content: No SI/HI; no hallucinations or delusions  Associations: Intact  Attention/Concentration: Intact  Memory: Recent and remote intact  Fund of Knowledge: Unable to assess   Insight: Questionable  Judgment: Limited      Assessment:  Unspecified mood disorder    Plan:  -Patient does not meet PEC criteria as he has not threatened SI/HI, has not been violent, and came willingly to seek care  -He does not appear psychotic or delirious. Patient explained his current medical situation to me, as well as the risks of not receiving treatment (including possible death). He expresses a desire to seek care elsewhere  -Dr. Roe with general surgery told me that the cholecystectomy is not urgent, and they are comfortable with the patient being discharged  -The patient gathered his belongings and left AMA and refused to speak further with me or his surgeons, and he refused to sign paperwork    Case discussed with Dr. Felipe  My assessment relayed to Obs nursing and General Surgery    Jarrod Beach MD  [employer] [facility] PGY2  6/27/2017  Pager: [phone]          "

## 2017-06-27 NOTE — PROGRESS NOTES
Called by observation unit that they had to call security on patient. When I saw him this am, he was angry and perseverating on his IV and refused to speak to me. Nursing states he became enraged and began yelling at them. He was seen by myself and Dr. Corona, he stated that he feared for his life and knew that we had called security to kill him. We tried to explain the situation but he began yelling again. His surgery is not an emergency and he is otherwise clinically stable. Psychiatry was consulted as this seemed his more pressing medical problem. PEC was not deemed necessary, patient left AMA.     Mary Jo Oneal PGY3  [phone]

## 2017-06-27 NOTE — PLAN OF CARE
Problem: Patient Care Overview  Goal: Plan of Care Review  Outcome: Ongoing (interventions implemented as appropriate)  Patient AAO,VSS. Complains of ABD pain rating 9/10. PRN medication given. POC reviewed with patient. Bed in lowest position with wheels locked. Call bell and side table in reach of patient. Will continue to monitor patient for pain.

## 2017-06-27 NOTE — PROGRESS NOTES
"Patient left unit stating " I don't feel safe" while 2 physicians from psychiatry were in the room trying to speak to him concerning his plan of care. PCT caught up with patient in hallway and confirmed that patient no longer had an IV in his arm. PCT reports security caught up with patient at the hospital's main entrance where they attempted to talk the patient into staying. The patient refused and requested to be left alone. PCT stated that she observed the patient leaving the hospital grounds and walking across [facility]. General surgery resident notified that patient eloped.   "

## 2017-06-30 NOTE — DISCHARGE SUMMARY
[facility]  General Surgery  Discharge Summary      Patient Name: Max Gaona  MRN: [id number]  Admission Date: 6/26/2017  Hospital Length of Stay: 0 days  Discharge Date and Time: 6/27/2017 10:36 AM  Attending Physician: Alan Corona  Discharging Provider: Mary Jo Oneal MD  Primary Care Provider: Christine Willoughby MD     HPI: admitted for biliary colic    Procedure(s) (LRB):  CHOLECYSTECTOMY-LAPAROSCOPIC (N/A)     Hospital Course: When I saw him this am, he was angry and perseverating on his IV and refused to speak to me. Nursing states he became enraged and began yelling at them. He was seen by myself and Dr. Corona, he stated that he feared for his life and knew that we had called security to kill him. We tried to explain the situation but he began yelling again. His surgery is not an emergency and he is otherwise clinically stable. Psychiatry was consulted as this seemed his more pressing medical problem. PEC was not deemed necessary, patient left AMA.     Consults:   Consults         Status Ordering Provider     Inpatient consult to General Surgery  Once     Provider:  (Not yet assigned)    Completed RALPH TEE          Significant Diagnostic Studies: see chart    Pending Diagnostic Studies:     None        Final Active Diagnoses:    Diagnosis Date Noted POA    PRINCIPAL PROBLEM:  Calculus of gallbladder with acute cholecystitis without obstruction [K80.00] 06/26/2017 Unknown      Problems Resolved During this Admission:    Diagnosis Date Noted Date Resolved POA      Discharged Condition: fair    Disposition: Eloped    Follow Up:  Follow-up Information     Alan Corona MD In 2 weeks.    Specialties:  General Surgery, Surgery  Why:  Appt letter mailed for wound check/Appt: 7/18/17 at 10:15 AM.   [facility] information:  [address] [facility] [address]  [phone]                 Patient Instructions:   No discharge procedures on file.  Medications:  Reconciled  Home Medications:   Discharge Medication List as of 6/27/2017 10:44 AM      CONTINUE these medications which have NOT CHANGED    Details   SUBOXONE 8-2 mg Film Starting 1/2/2015, Until Discontinued, Historical Med             Mary Jo Oneal MD  General Surgery  [facility]

## 2017-07-18 ENCOUNTER — TELEPHONE (OUTPATIENT)
Dept: SURGERY | Facility: CLINIC | Age: 42
End: 2017-07-18

## 2017-11-14 ENCOUNTER — OFFICE VISIT (OUTPATIENT)
Dept: INTERNAL MEDICINE | Facility: CLINIC | Age: 42
End: 2017-11-14
Payer: MEDICARE

## 2017-11-14 VITALS
HEIGHT: 68 IN | HEART RATE: 63 BPM | TEMPERATURE: 99 F | BODY MASS INDEX: 25.31 KG/M2 | WEIGHT: 167 LBS | OXYGEN SATURATION: 98 % | DIASTOLIC BLOOD PRESSURE: 80 MMHG | SYSTOLIC BLOOD PRESSURE: 116 MMHG

## 2017-11-14 DIAGNOSIS — R10.9 ABDOMINAL PAIN, UNSPECIFIED ABDOMINAL LOCATION: Primary | ICD-10-CM

## 2017-11-14 PROCEDURE — 99999 PR PBB SHADOW E&M-EST. PATIENT-LVL IV: CPT | Mod: PBBFAC,,, | Performed by: INTERNAL MEDICINE

## 2017-11-14 PROCEDURE — 99213 OFFICE O/P EST LOW 20 MIN: CPT | Mod: S$GLB,,, | Performed by: INTERNAL MEDICINE

## 2017-11-19 NOTE — PROGRESS NOTES
Subjective:       Patient ID: Max Gaona is a 41 y.o. male.    Chief Complaint: Abdominal Pain    HPI: He complains of chronic abdominal pain.  Complains of early satiety.  No vomiting.  No diarrhea.  No blood in stool  Review of Systems   Constitutional: Negative for chills, fatigue, fever and unexpected weight change.   Respiratory: Negative for chest tightness and shortness of breath.    Cardiovascular: Negative for chest pain and palpitations.   Gastrointestinal: Negative for abdominal pain and blood in stool.   Neurological: Negative for dizziness, syncope, numbness and headaches.       Objective:      Physical Exam   HENT:   Right Ear: External ear normal.   Left Ear: External ear normal.   Nose: Nose normal.   Mouth/Throat: Oropharynx is clear and moist.   Eyes: Pupils are equal, round, and reactive to light.   Neck: Normal range of motion.   Cardiovascular: Normal rate and regular rhythm.    No murmur heard.  Pulmonary/Chest: Breath sounds normal.   Abdominal: He exhibits no distension. There is no hepatosplenomegaly. There is no tenderness.   Lymphadenopathy:     He has no cervical adenopathy.     He has no axillary adenopathy.   Neurological: He has normal strength and normal reflexes. No cranial nerve deficit or sensory deficit.       Assessment:     assessment and plan: Abdominal pain: Schedule gastroenterology appointment.  Check CMP and CBC.  He was here for an urgent care only appointment.  He will follow-up with his primary care physician for a physical      Plan:       As above